# Patient Record
(demographics unavailable — no encounter records)

---

## 2024-11-27 NOTE — HISTORY OF PRESENT ILLNESS
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 According to pt's father, pt was diagnosed with JS and DW under the age of 10 but does not recall how he was diagnosed (genetic testing?). Pt was prev taking ursodiol, but re-started for empiric treatment.   - Fibroscan 4/17/23 F2 S0 (7.4 kPa 137 dB/m) - US abd 5/22/23 and 4/4/2024 both with normal abd ultrasound. WNL liver and spleen.  S/p liver bx 7/17/2024 for persistently elevated & increasing transaminases: Advanced fibrosis with features suggestive of congenital hepatic fibrosis --> need HCC screening q6m   Interim hx - LOV 7/10/24  - Accompanied by father to today's visit  - Labs last done 7/2024  - Imaging done 4/2024 as US ABD  - Pt reports he is taking Ursodiol 300mg BID. - Has annual visit w/ neurology pending 4/2025  - Denies n/v/d, excessive fatigue, chest pain, SOB, palpitations, lightheadedness/ dizziness, melena, unintentional weight loss, acute hospitalizations or ER visits. Afebrile today. Appetite is good.  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 Tobacco: Never Alcohol: Denies illicit drug: Denies Herb and dietary Supplement: Denies [FMH of liver disease] None  [Labs] 4/10/24 AFP 2.9 CBC wnl, normal , INR 0.94 AST 30, ALT 66,   4/17/23 AFP 2.4 INR 1.08 CBC wnl except  CMP WNL   9/19/22 AFP 2.4 INR 1.02 Na 141 K 4.1 Cr 0.74 Alb 4.7 TB 1 AST/ALT 46/79    wbc 4.93 Hb 15.6  ASMA+ 1:20 IGG, IGA, IGM, GRICELDA Negative  [Imaging] 4/4/24: FINDINGS: Liver: Within normal limits. Bile ducts: Normal caliber. Common bile duct measures 4 mm. Gallbladder: Within normal limits. Pancreas: Evaluation of the pancreas is limited secondary to overlying bowel gas. Spleen: 10.2 cm. Within normal limits. ... Ascites: None. Aorta and IVC: Visualized portions are within normal limits. IMPRESSION: Unremarkable abdominal ultrasound.  MRCP with [facility] 10/21/22: Normal liver, normal BD,GB,spleen, pancreas, adrenal,kidneyAA?No acute or relevant hepatobiliary pathology.  Fibroscan 3/3/22: F3 S0 (8.3kPa )  Chest X-ray 3/3/22: No acute cardiopul disease evident radiographically  [Procedures] EGD:10/12/22 Dr. Rg simmons: normal pr pt Colonoscopy: No prior

## 2024-11-27 NOTE — REVIEW OF SYSTEMS
[Sleep Disturbances] : sleep disturbances [Fever] : no fever [Chills] : no chills [Feeling Tired] : not feeling tired [Eye Pain] : no eye pain [Earache] : no earache [Chest Pain] : no chest pain [Palpitations] : no palpitations [Leg Claudication] : no intermittent leg claudication [Lower Ext Edema] : no extremity edema [Shortness Of Breath] : no shortness of breath [Wheezing] : no wheezing [Cough] : no cough [Abdominal Pain] : no abdominal pain [Vomiting] : no vomiting [Constipation] : no constipation [Diarrhea] : no diarrhea [Heartburn] : no heartburn [Melena] : no melena [Limb Swelling] : no limb swelling [Itching] : no itching [Dry Skin] : no dry skin [Confused] : no confusion [Dizziness] : no dizziness [Fainting] : no fainting [Difficulty Walking] : no difficulty walking [Anxiety] : no anxiety [Easy Bleeding] : no tendency for easy bleeding [FreeTextEntry4] : Tinnitus  [de-identified] : 2/2 tinnitus, pending annual neuro visit

## 2024-11-27 NOTE — HISTORY OF PRESENT ILLNESS
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 According to pt's father, pt was diagnosed with JS and DW under the age of 10 but does not recall how he was diagnosed (genetic testing?). Pt was prev taking ursodiol, but re-started for empiric treatment.   - Fibroscan 4/17/23 F2 S0 (7.4 kPa 137 dB/m) - US abd 5/22/23 and 4/4/2024 both with normal abd ultrasound. WNL liver and spleen.  S/p liver bx 7/17/2024 for persistently elevated & increasing transaminases: Advanced fibrosis with features suggestive of congenital hepatic fibrosis --> need HCC screening q6m   Interim hx - LOV 7/10/24  - Accompanied by father to today's visit  - Labs last done 7/2024  - Imaging done 4/2024 as US ABD  - Pt reports he is taking Ursodiol 300mg BID. - Has annual visit w/ neurology pending 4/2025  - Denies n/v/d, excessive fatigue, chest pain, SOB, palpitations, lightheadedness/ dizziness, melena, unintentional weight loss, acute hospitalizations or ER visits. Afebrile today. Appetite is good.  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 Tobacco: Never Alcohol: Denies illicit drug: Denies Herb and dietary Supplement: Denies [FMH of liver disease] None  [Labs] 4/10/24 AFP 2.9 CBC wnl, normal , INR 0.94 AST 30, ALT 66,   4/17/23 AFP 2.4 INR 1.08 CBC wnl except  CMP WNL   9/19/22 AFP 2.4 INR 1.02 Na 141 K 4.1 Cr 0.74 Alb 4.7 TB 1 AST/ALT 46/79    wbc 4.93 Hb 15.6  ASMA+ 1:20 IGG, IGA, IGM, GRICELDA Negative  [Imaging] 4/4/24: FINDINGS: Liver: Within normal limits. Bile ducts: Normal caliber. Common bile duct measures 4 mm. Gallbladder: Within normal limits. Pancreas: Evaluation of the pancreas is limited secondary to overlying bowel gas. Spleen: 10.2 cm. Within normal limits. ... Ascites: None. Aorta and IVC: Visualized portions are within normal limits. IMPRESSION: Unremarkable abdominal ultrasound.  MRCP with [facility] 10/21/22: Normal liver, normal BD,GB,spleen, pancreas, adrenal,kidneyAA?No acute or relevant hepatobiliary pathology.  Fibroscan 3/3/22: F3 S0 (8.3kPa )  Chest X-ray 3/3/22: No acute cardiopul disease evident radiographically  [Procedures] EGD:10/12/22 Dr. Rg simmons: normal pr pt Colonoscopy: No prior

## 2024-11-27 NOTE — ASSESSMENT
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  - No varices on EGD 10/12/22 - Will increase Ursodiol to 500 mg BID   # Health Maintenance - Non-immune to Hep B (HBsAg- HBcAb - HBsAb <3.0) - Hep C Negative - Non-immune to Hep A (Labs 9/19/22)  [Plan] - Labs today & again in 6 mo  - MRI abd due NOW w/ US ABD in 6 mo for HCC screening purposes  - RTO in 6 mo    Plans & discussions received well by patient. No outstanding questions or concerns at conclusion of visit. Patient provided the phone number for ALP to address questions/ concerns that may arise in the interim and instructed on how to contact language appropriate staff member.

## 2024-11-27 NOTE — ASSESSMENT
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   Fibroscan 4/17/23 F2 S0 (7.4 kPa 137 dB/m); Due for Fibro 4/2025   [Plan] # Congenital hepatic fibrosis, bx confirmed (7/17/24)  # Divine syndrome # Dandy-walker - Dandy-Walker malformation coexisted with Divine syndrome - Will need q6m HCC screening, last done as US ABD 4/2024, will order MR abd due for now, followed by US ABD in 6 mo.  - No varices on EGD 10/12/22 - Will increase Ursodiol to 500 mg BID   # Health Maintenance - Non-immune to Hep B (HBsAg- HBcAb - HBsAb <3.0) - Hep C Negative - Non-immune to Hep A (Labs 9/19/22)  [Plan] - Labs today & again in 6 mo  - MRI abd due NOW w/ US ABD in 6 mo for HCC screening purposes  - RTO in 6 mo    Plans & discussions received well by patient. No outstanding questions or concerns at conclusion of visit. Patient provided the phone number for ALP to address questions/ concerns that may arise in the interim and instructed on how to contact language appropriate staff member.

## 2024-11-27 NOTE — PHYSICAL EXAM
[General Appearance - Alert] : alert [General Appearance - In No Acute Distress] : in no acute distress [Neck Appearance] : the appearance of the neck was normal [] : no respiratory distress [Edema] : there was no peripheral edema [Abdomen Soft] : soft [Abdomen Tenderness] : non-tender [Abdomen Mass (___ Cm)] : no abdominal mass palpated [Abnormal Walk] : normal gait [Skin Color & Pigmentation] : normal skin color and pigmentation [Oriented To Time, Place, And Person] : oriented to person, place, and time [Respiration, Rhythm And Depth] : normal respiratory rhythm and effort [Exaggerated Use Of Accessory Muscles For Inspiration] : no accessory muscle use [Auscultation Breath Sounds / Voice Sounds] : lungs were clear to auscultation bilaterally [Heart Rate And Rhythm] : heart rate was normal and rhythm regular [Heart Sounds] : normal S1 and S2 [Bowel Sounds] : normal bowel sounds [Musculoskeletal - Swelling] : no joint swelling seen [No Focal Deficits] : no focal deficits [Impaired Insight] : insight and judgment were intact [Affect] : the affect was normal [Scleral Icterus] : No Scleral Icterus [Spider Angioma] : No spider angioma(s) were observed [Abdominal  Ascites] : no ascites [Splenomegaly] : no splenomegaly [Asterixis] : no asterixis observed [Jaundice] : No jaundice [Palmar Erythema] : no Palmar Erythema [FreeTextEntry1] : slow response to answer

## 2024-11-27 NOTE — REVIEW OF SYSTEMS
[Sleep Disturbances] : sleep disturbances [Fever] : no fever [Chills] : no chills [Feeling Tired] : not feeling tired [Eye Pain] : no eye pain [Earache] : no earache [Chest Pain] : no chest pain [Palpitations] : no palpitations [Leg Claudication] : no intermittent leg claudication [Lower Ext Edema] : no extremity edema [Shortness Of Breath] : no shortness of breath [Wheezing] : no wheezing [Cough] : no cough [Abdominal Pain] : no abdominal pain [Vomiting] : no vomiting [Constipation] : no constipation [Diarrhea] : no diarrhea [Heartburn] : no heartburn [Melena] : no melena [Limb Swelling] : no limb swelling [Itching] : no itching [Dry Skin] : no dry skin [Confused] : no confusion [Dizziness] : no dizziness [Fainting] : no fainting [Difficulty Walking] : no difficulty walking [Anxiety] : no anxiety [Easy Bleeding] : no tendency for easy bleeding [FreeTextEntry4] : Tinnitus  [de-identified] : 2/2 tinnitus, pending annual neuro visit

## 2025-03-12 NOTE — REVIEW OF SYSTEMS
[Fever] : no fever [Chills] : no chills [Feeling Tired] : not feeling tired [Eye Pain] : no eye pain [Earache] : no earache [Chest Pain] : no chest pain [Palpitations] : no palpitations [Leg Claudication] : no intermittent leg claudication [Lower Ext Edema] : no extremity edema [Shortness Of Breath] : no shortness of breath [Wheezing] : no wheezing [Cough] : no cough [Abdominal Pain] : no abdominal pain [Vomiting] : no vomiting [Constipation] : no constipation [Diarrhea] : no diarrhea [Heartburn] : no heartburn [Melena] : no melena [Limb Swelling] : no limb swelling [Itching] : no itching [Dry Skin] : no dry skin [Confused] : no confusion [Dizziness] : no dizziness [Fainting] : no fainting [Difficulty Walking] : no difficulty walking [Sleep Disturbances] : sleep disturbances [Anxiety] : no anxiety [Easy Bleeding] : no tendency for easy bleeding [FreeTextEntry4] : Tinnitus  [de-identified] : 2/2 tinnitus, pending annual neuro visit

## 2025-03-12 NOTE — PHYSICAL EXAM
[Scleral Icterus] : No Scleral Icterus [Spider Angioma] : No spider angioma(s) were observed [Abdominal  Ascites] : no ascites [Splenomegaly] : no splenomegaly [Asterixis] : no asterixis observed [Jaundice] : No jaundice [Palmar Erythema] : no Palmar Erythema [General Appearance - Alert] : alert [General Appearance - In No Acute Distress] : in no acute distress [FreeTextEntry1] : slow response to answer [Neck Appearance] : the appearance of the neck was normal [] : no respiratory distress [Respiration, Rhythm And Depth] : normal respiratory rhythm and effort [Exaggerated Use Of Accessory Muscles For Inspiration] : no accessory muscle use [Auscultation Breath Sounds / Voice Sounds] : lungs were clear to auscultation bilaterally [Heart Rate And Rhythm] : heart rate was normal and rhythm regular [Heart Sounds] : normal S1 and S2 [Edema] : there was no peripheral edema [Bowel Sounds] : normal bowel sounds [Abdomen Soft] : soft [Abdomen Tenderness] : non-tender [Abdomen Mass (___ Cm)] : no abdominal mass palpated [Abnormal Walk] : normal gait [Musculoskeletal - Swelling] : no joint swelling seen [Skin Color & Pigmentation] : normal skin color and pigmentation [No Focal Deficits] : no focal deficits [Oriented To Time, Place, And Person] : oriented to person, place, and time [Impaired Insight] : insight and judgment were intact [Affect] : the affect was normal

## 2025-03-12 NOTE — ASSESSMENT
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  Fibroscan 4/17/23 F2 S0 (7.4 kPa 137 dB/m); Due for Fibro 4/2025   education and counseling are done HCC screening every 6 mo US and MRI alternating with AFP Continue Ursodiol to 500 mg BID   # Health Maintenance - Non-immune to Hep B (HBsAg- HBcAb - HBsAb <3.0)--> recommend hepatitis B vaccination  - Non-immune to Hep A--> recommend hepatitis A vaccination   Labs and US in 6 mo  RTO 6 mo  Plans & discussions received well by patient. No outstanding questions or concerns at conclusion of visit. Patient provided the phone number for ALP to address questions/ concerns that may arise in the interim and instructed on how to contact language appropriate staff member.

## 2025-03-12 NOTE — HISTORY OF PRESENT ILLNESS
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 According to pt's father, pt was diagnosed with JS and DW under the age of 10 but does not recall how he was diagnosed (genetic testing?). Pt was prev taking ursodiol, but re-started for empiric treatment.   11/27/2024 0.93, plt 159, AST 46 ALT 80   1/28/2025 MRI:  Normal morphology. No steatosis. Hyperenhancement of segments 5 and 8 on arterial phase with isointensity of the segments on portal venous phase, consistent with perfusional defect. No suspicious lesion.  - Fibroscan 4/17/23 F2 S0 (7.4 kPa 137 dB/m) - US abd 5/22/23 and 4/4/2024 both with normal abd ultrasound. WNL liver and spleen.  S/p liver bx 7/17/2024 for persistently elevated & increasing transaminases: Advanced fibrosis with features suggestive of congenital hepatic fibrosis --> need HCC screening q6m   Interim hx - LOV 7/10/24  - Accompanied by father to today's visit  - Labs last done 7/2024  - Imaging done 4/2024 as US ABD  - Pt reports he is taking Ursodiol 300mg BID. - Has annual visit w/ neurology pending 4/2025  - Denies n/v/d, excessive fatigue, chest pain, SOB, palpitations, lightheadedness/ dizziness, melena, unintentional weight loss, acute hospitalizations or ER visits. Afebrile today. Appetite is good.  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 Tobacco: Never Alcohol: Denies illicit drug: Denies Herb and dietary Supplement: Denies [FMH of liver disease] None  [Labs] 4/10/24 AFP 2.9 CBC wnl, normal , INR 0.94 AST 30, ALT 66,   4/17/23 AFP 2.4 INR 1.08 CBC wnl except  CMP WNL   9/19/22 AFP 2.4 INR 1.02 Na 141 K 4.1 Cr 0.74 Alb 4.7 TB 1 AST/ALT 46/79    wbc 4.93 Hb 15.6  ASMA+ 1:20 IGG, IGA, IGM, GRICELDA Negative  [Imaging] 4/4/24: FINDINGS: Liver: Within normal limits. Bile ducts: Normal caliber. Common bile duct measures 4 mm. Gallbladder: Within normal limits. Pancreas: Evaluation of the pancreas is limited secondary to overlying bowel gas. Spleen: 10.2 cm. Within normal limits. ... Ascites: None. Aorta and IVC: Visualized portions are within normal limits. IMPRESSION: Unremarkable abdominal ultrasound.  MRCP with [facility] 10/21/22: Normal liver, normal BD,GB,spleen, pancreas, adrenal,kidneyAA?No acute or relevant hepatobiliary pathology.  Fibroscan 3/3/22: F3 S0 (8.3kPa )  Chest X-ray 3/3/22: No acute cardiopul disease evident radiographically  [Procedures] EGD:10/12/22 Dr. Rg simmons: normal pr pt Colonoscopy: No prior

## 2025-05-14 NOTE — REVIEW OF SYSTEMS
[Fever] : no fever [Chills] : no chills [Feeling Tired] : not feeling tired [Eye Pain] : no eye pain [Earache] : no earache [Chest Pain] : no chest pain [Palpitations] : no palpitations [Leg Claudication] : no intermittent leg claudication [Lower Ext Edema] : no extremity edema [Shortness Of Breath] : no shortness of breath [Wheezing] : no wheezing [Cough] : no cough [Abdominal Pain] : no abdominal pain [Vomiting] : no vomiting [Constipation] : no constipation [Diarrhea] : no diarrhea [Heartburn] : no heartburn [Melena] : no melena [Limb Swelling] : no limb swelling [Itching] : no itching [Dry Skin] : no dry skin [Confused] : no confusion [Dizziness] : no dizziness [Fainting] : no fainting [Difficulty Walking] : no difficulty walking [Sleep Disturbances] : sleep disturbances [Anxiety] : no anxiety [Easy Bleeding] : no tendency for easy bleeding [FreeTextEntry4] : Tinnitus  [de-identified] : 2/2 tinnitus, pending annual neuro visit

## 2025-05-14 NOTE — ASSESSMENT
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

## 2025-05-14 NOTE — HISTORY OF PRESENT ILLNESS
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 According to pt's father, pt was diagnosed with JS and DW under the age of 10 but does not recall how he was diagnosed (genetic testing?). Pt was prev taking ursodiol, but re-started for empiric treatment.  [Liver biopsy] - S/p liver bx 7/17/2024 for persistently elevated & increasing transaminases: Advanced fibrosis with features suggestive of congenital hepatic fibrosis --> need HCC screening q6m  - Pt reports he is taking Ursodiol 300mg BID. - Fibroscan today 5/14/25:  9.9kPa F2 - Denies n/v/d, excessive fatigue, chest pain, SOB, palpitations, lightheadedness/ dizziness, melena, unintentional weight loss, acute hospitalizations or ER visits. Afebrile today. Appetite is good.  [Medical Hx] as above [Surgical Hx] None [Social Hx] [place] when he was 17. Tobacco: Never Alcohol: Denies illicit drug: Denies Herb and dietary Supplement: Denies [FMH of liver disease] None  [Labs] 11/27/2024 0.93, plt 159, AST 46 ALT 80   9/19/22 ASMA+ 1:20 IGG, IGA, IGM, GRICELDA Negative  [Imaging] 1/28/2025 MRI: Normal morphology. No steatosis. Hyperenhancement of segments 5 and 8 on arterial phase with isointensity of the segments on portal venous phase, consistent with perfusional defect. No suspicious lesion.  MRCP with [facility] 10/21/22: Normal liver, normal BD,GB,spleen, pancreas, adrenal,kidneyAA?No acute or relevant hepatobiliary pathology.  Fibroscan 3/3/22: F3 S0 (8.3kPa )  [Procedures] EGD:10/12/22 Dr. Rg simmons: normal pr pt Colonoscopy: No prior